# Patient Record
Sex: FEMALE | Race: WHITE | Employment: FULL TIME | URBAN - METROPOLITAN AREA
[De-identification: names, ages, dates, MRNs, and addresses within clinical notes are randomized per-mention and may not be internally consistent; named-entity substitution may affect disease eponyms.]

---

## 2020-03-17 ENCOUNTER — OFFICE VISIT (OUTPATIENT)
Dept: PRIMARY CARE CLINIC | Age: 30
End: 2020-03-17

## 2020-03-17 VITALS
SYSTOLIC BLOOD PRESSURE: 125 MMHG | OXYGEN SATURATION: 98 % | TEMPERATURE: 99.1 F | WEIGHT: 228 LBS | RESPIRATION RATE: 16 BRPM | HEART RATE: 75 BPM | BODY MASS INDEX: 37.99 KG/M2 | DIASTOLIC BLOOD PRESSURE: 81 MMHG | HEIGHT: 65 IN

## 2020-03-17 DIAGNOSIS — E28.2 PCOD (POLYCYSTIC OVARIAN DISEASE): Primary | ICD-10-CM

## 2020-03-17 DIAGNOSIS — L21.9 SEBORRHEIC DERMATITIS: ICD-10-CM

## 2020-03-17 DIAGNOSIS — N92.6 IRREGULAR PERIODS: ICD-10-CM

## 2020-03-17 DIAGNOSIS — Z12.4 CERVICAL CANCER SCREENING: ICD-10-CM

## 2020-03-17 DIAGNOSIS — Z20.2 POSSIBLE EXPOSURE TO STD: ICD-10-CM

## 2020-03-17 DIAGNOSIS — E66.9 OBESITY (BMI 30-39.9): ICD-10-CM

## 2020-03-17 RX ORDER — SPIRONOLACTONE 25 MG/1
25 TABLET ORAL DAILY
Qty: 30 TAB | Refills: 0 | Status: SHIPPED | OUTPATIENT
Start: 2020-03-17 | End: 2020-04-09

## 2020-03-17 RX ORDER — NORGESTIMATE AND ETHINYL ESTRADIOL 0.25-0.035
1 KIT ORAL DAILY
Qty: 2 DOSE PACK | Refills: 0 | Status: SHIPPED | OUTPATIENT
Start: 2020-03-17 | End: 2020-05-04

## 2020-03-17 NOTE — PROGRESS NOTES
Written by Alexander Crum, as dictated by Dr. Akua Rahman MD.    Dnona Hawkins is a 34 y.o. female. HPI  The patient presents today to establish care. Pt is not fasting for labs. She has recently moved to the [facility] from [place] and was referred here by a co-worker. She has not seen a PCP in two years, as she did not have insurance. She would like to get refills of birth control and Spirolactone for acne. She has a history of irregular periods, but does not have a known history of PCOS. She has been trying to control her weight through dietary changes, but she has had difficulty in losing weight. She also requests STD and pregnancy testing today, as she is sexually active. Of note, she has a family history of thyroid issues (mother). She denies history of Vitamin D deficiency. She has a history of seborrheic dermatitis and used a shampoo in the past that she would like to resume using. She remembers that the shampoo is brown in color, but cannot remember the name. She reports that her scalp issues tend to be worse with cold weather. She does not smoke. She would like a referral for OB/GYN today. She did not receive a flu vaccine. 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 Patient Active Problem List   Diagnosis Code    PCOD (polycystic ovarian disease) E28.2        No current outpatient medications on file prior to visit. No current facility-administered medications on file prior to visit. Allergies   Allergen Reactions    Sulfamethoxazole-Trimethoprim Hives       History reviewed. No pertinent past medical history.     Past Surgical History:   Procedure Laterality Date    HX HEENT         Family History   Problem Relation Age of Onset    Thyroid Disease Mother     Hypertension Father     No Known Problems Sister     OCD Brother        Social History     Socioeconomic History    Marital status: SINGLE     Spouse name: Not on file    Number of children: Not on file    Years of education: Not on file    Highest education level: Not on file   Occupational History    Not on file   Social Needs    Financial resource strain: Not on file    Food insecurity     Worry: Not on file     Inability: Not on file    Transportation needs     Medical: Not on file     Non-medical: Not on file   Tobacco Use    Smoking status: Former Smoker     Last attempt to quit: 3/17/2018     Years since quittin.0    Smokeless tobacco: Never Used   Substance and Sexual Activity    Alcohol use: Yes     Alcohol/week: 3.0 standard drinks     Types: 1 Glasses of wine, 1 Cans of beer, 1 Shots of liquor per week     Comment: 2 x weekly on weekends    Drug use: Not Currently    Sexual activity: Yes     Partners: Male     Birth control/protection: None   Lifestyle    Physical activity     Days per week: Not on file     Minutes per session: Not on file    Stress: Not on file   Relationships    Social connections     Talks on phone: Not on file     Gets together: Not on file     Attends Buddhism service: Not on file     Active member of club or organization: Not on file     Attends meetings of clubs or organizations: Not on file     Relationship status: Not on file    Intimate partner violence     Fear of current or ex partner: Not on file     Emotionally abused: Not on file     Physically abused: Not on file     Forced sexual activity: Not on file   Other Topics Concern    Not on file   Social History Narrative    Not on file       Review of Systems   Constitutional: Negative for malaise/fatigue and weight loss. HENT: Negative for congestion and hearing loss. Eyes: Negative for blurred vision and photophobia. Respiratory: Negative for cough and shortness of breath. Cardiovascular: Negative for chest pain and leg swelling. Gastrointestinal: Negative for constipation, diarrhea and heartburn. Genitourinary: Negative for dysuria, frequency and urgency. Musculoskeletal: Negative for joint pain and myalgias. Skin:        Positive for dry skin (scalp). Neurological: Negative for dizziness and headaches. Psychiatric/Behavioral: Negative for depression and substance abuse. The patient is not nervous/anxious and does not have insomnia. Visit Vitals  /81   Pulse 75   Temp 99.1 °F (37.3 °C) (Oral)   Resp 16   Ht 5' 5\" (1.651 m)   Wt 228 lb (103.4 kg)   LMP 02/17/2020   SpO2 98%   BMI 37.94 kg/m²       Physical Exam  Vitals signs and nursing note reviewed. Constitutional:       General: She is not in acute distress. Appearance: Normal appearance. She is well-developed and well-groomed. She is obese. She is not diaphoretic. HENT:      Head: Normocephalic and atraumatic. Nose:      Right Sinus: No maxillary sinus tenderness. Left Sinus: No maxillary sinus tenderness. Eyes:      General:         Right eye: No discharge. Left eye: No discharge. Conjunctiva/sclera: Conjunctivae normal.      Pupils: Pupils are equal, round, and reactive to light. Neck:      Musculoskeletal: Normal range of motion and neck supple. Thyroid: No thyromegaly. Cardiovascular:      Rate and Rhythm: Normal rate and regular rhythm. Pulses: Normal pulses. Heart sounds: Normal heart sounds. No murmur. No friction rub. No gallop. Pulmonary:      Effort: Pulmonary effort is normal.      Breath sounds: Normal breath sounds. No wheezing. Abdominal:      General: Bowel sounds are normal. There is no distension. Palpations: Abdomen is soft. Tenderness: There is no abdominal tenderness. Lymphadenopathy:      Cervical: No cervical adenopathy. Neurological:      Mental Status: She is alert and oriented to person, place, and time. Psychiatric:         Behavior: Behavior normal.         Thought Content: Thought content normal.       ASSESSMENT and PLAN    ICD-10-CM ICD-9-CM    1.  PCOD (polycystic ovarian disease) E28.2 256.4 REFERRAL TO OBSTETRICS AND GYNECOLOGY    Referred to OB/GYN. spironolactone (ALDACTONE) 25 mg tablet sent to pharmacy    Spirolactone 25 mg prescribed. Potential side effects were discussed. Pt should take 1 tab daily. METABOLIC PANEL, COMPREHENSIVE      CBC W/O DIFF      TSH 3RD GENERATION      INSULIN    CMP, CBC, TSH, and Insulin ordered. 2. Possible exposure to STD Z20.2 V01.6 CT+NG+M GENITALIUM BY KAROL, UR      HCG QL SERUM      BETA HCG, QT    CT+NG+M Genitalium, HCG QL serum, and beta HCG ordered. 3. Seborrheic dermatitis L21.9 690.10 Recommended that she purchase OTC T-gel shampoo 2-3 times per week. Written instructions were given to the pt.   4. Obesity (BMI 30-39. 9) E66.9 278.00 Explained that restarting Spirolactone may help with controlling food cravings. 5. Cervical cancer screening Z12.4 V76.2 REFERRAL TO OBSTETRICS AND GYNECOLOGY    Referred to OB/GYN. 6. Irregular periods N92.6 626.4 norgestimate-ethinyl estradioL (ORTHO-CYCLEN, SPRINTEC) 0.25-35 mg-mcg tab sent to pharmacy    Norgestimate-ethinyl estradiol 0.25-35 mg-mcg prescribed. Potential side effects were discussed. Pt should take 1 tab daily. HCG QL SERUM      BETA HCG, QT    HCG QL serum and beta HCG ordered. This plan was reviewed with the patient and patient agrees. All questions were answered. This scribe documentation was reviewed by me and accurately reflects the examination and decisions made by me. This note will not be viewable in [address]

## 2020-03-17 NOTE — PROGRESS NOTES
Chief Complaint   Patient presents with   [facility] Establish Care     new patient, wants STD testing, referral to GYN ,         1. Have you been to the ER, urgent care clinic since your last visit? [facility]. Hospitalized since your last visit? 2. Have you seen or consulted any other health care providers outside of the [address] [place] [address] since your last visit? Include any pap smears or colon screening.  no

## 2020-03-18 LAB
ALBUMIN SERPL-MCNC: 4.6 G/DL (ref 3.9–5)
ALBUMIN/GLOB SERPL: 1.6 {RATIO} (ref 1.2–2.2)
ALP SERPL-CCNC: 80 IU/L (ref 39–117)
ALT SERPL-CCNC: 8 IU/L (ref 0–32)
AST SERPL-CCNC: 12 IU/L (ref 0–40)
B-HCG SERPL QL: NEGATIVE MIU/ML
BILIRUB SERPL-MCNC: <0.2 MG/DL (ref 0–1.2)
BUN SERPL-MCNC: 7 MG/DL (ref 6–20)
BUN/CREAT SERPL: 11 (ref 9–23)
CALCIUM SERPL-MCNC: 9.3 MG/DL (ref 8.7–10.2)
CHLORIDE SERPL-SCNC: 105 MMOL/L (ref 96–106)
CO2 SERPL-SCNC: 21 MMOL/L (ref 20–29)
CREAT SERPL-MCNC: 0.64 MG/DL (ref 0.57–1)
ERYTHROCYTE [DISTWIDTH] IN BLOOD BY AUTOMATED COUNT: 12.9 % (ref 11.7–15.4)
GLOBULIN SER CALC-MCNC: 2.9 G/DL (ref 1.5–4.5)
GLUCOSE SERPL-MCNC: 107 MG/DL (ref 65–99)
HCG INTACT+B SERPL-ACNC: <1 MIU/ML
HCT VFR BLD AUTO: 36.4 % (ref 34–46.6)
HGB BLD-MCNC: 12.1 G/DL (ref 11.1–15.9)
INSULIN SERPL-ACNC: 52 UIU/ML (ref 2.6–24.9)
MCH RBC QN AUTO: 29.9 PG (ref 26.6–33)
MCHC RBC AUTO-ENTMCNC: 33.2 G/DL (ref 31.5–35.7)
MCV RBC AUTO: 90 FL (ref 79–97)
PLATELET # BLD AUTO: 320 X10E3/UL (ref 150–450)
POTASSIUM SERPL-SCNC: 4 MMOL/L (ref 3.5–5.2)
PROT SERPL-MCNC: 7.5 G/DL (ref 6–8.5)
RBC # BLD AUTO: 4.05 X10E6/UL (ref 3.77–5.28)
SODIUM SERPL-SCNC: 141 MMOL/L (ref 134–144)
TSH SERPL DL<=0.005 MIU/L-ACNC: 0.99 UIU/ML (ref 0.45–4.5)
WBC # BLD AUTO: 12.3 X10E3/UL (ref 3.4–10.8)

## 2020-03-23 LAB
C TRACH RRNA UR QL NAA+PROBE: NEGATIVE
MYCOPLASMA GENITALIUM NAA, 180024: NEGATIVE
N GONORRHOEA RRNA UR QL NAA+PROBE: NEGATIVE

## 2020-04-09 DIAGNOSIS — E28.2 PCOD (POLYCYSTIC OVARIAN DISEASE): ICD-10-CM

## 2020-04-09 RX ORDER — SPIRONOLACTONE 25 MG/1
TABLET ORAL
Qty: 30 TAB | Refills: 0 | Status: SHIPPED | OUTPATIENT
Start: 2020-04-09 | End: 2020-05-09

## 2020-05-09 DIAGNOSIS — E28.2 PCOD (POLYCYSTIC OVARIAN DISEASE): ICD-10-CM

## 2020-05-09 RX ORDER — SPIRONOLACTONE 25 MG/1
TABLET ORAL
Qty: 30 TAB | Refills: 0 | Status: SHIPPED | OUTPATIENT
Start: 2020-05-09 | End: 2020-06-06

## 2020-06-06 DIAGNOSIS — E28.2 PCOD (POLYCYSTIC OVARIAN DISEASE): ICD-10-CM

## 2020-06-06 RX ORDER — SPIRONOLACTONE 25 MG/1
TABLET ORAL
Qty: 30 TAB | Refills: 0 | Status: SHIPPED | OUTPATIENT
Start: 2020-06-06 | End: 2020-07-01

## 2020-06-17 DIAGNOSIS — L21.9 SEBORRHEIC DERMATITIS: Primary | ICD-10-CM

## 2020-06-17 RX ORDER — CLINDAMYCIN PHOSPHATE AND BENZOYL PEROXIDE 10; 50 MG/G; MG/G
GEL TOPICAL
Qty: 1 TUBE | Refills: 0 | Status: SHIPPED | OUTPATIENT
Start: 2020-06-17 | End: 2021-01-28 | Stop reason: ALTCHOICE

## 2020-06-17 NOTE — TELEPHONE ENCOUNTER
Last office visit 3/17/2020  Last med refill  spironolactate-taking 25 wants it increased.   6/6/2020  clyndamycin not ordered here before

## 2020-06-24 ENCOUNTER — TELEPHONE (OUTPATIENT)
Dept: PRIMARY CARE CLINIC | Age: 30
End: 2020-06-24

## 2020-06-24 ENCOUNTER — DOCUMENTATION ONLY (OUTPATIENT)
Dept: PRIMARY CARE CLINIC | Age: 30
End: 2020-06-24

## 2020-06-24 ENCOUNTER — PATIENT MESSAGE (OUTPATIENT)
Dept: PRIMARY CARE CLINIC | Age: 30
End: 2020-06-24

## 2020-06-24 DIAGNOSIS — L70.8 OTHER ACNE: Primary | ICD-10-CM

## 2020-06-24 RX ORDER — ERYTHROMYCIN AND BENZOYL PEROXIDE 30; 50 MG/G; MG/G
GEL TOPICAL 2 TIMES DAILY
Qty: 46.6 G | Refills: 0 | Status: SHIPPED | OUTPATIENT
Start: 2020-06-24 | End: 2020-08-27

## 2020-06-24 NOTE — TELEPHONE ENCOUNTER
Let her know I sent another gel. If it`s not covered then she needs to find out form her insurance formulary .

## 2020-07-01 DIAGNOSIS — E28.2 PCOD (POLYCYSTIC OVARIAN DISEASE): ICD-10-CM

## 2020-07-01 RX ORDER — SPIRONOLACTONE 25 MG/1
TABLET ORAL
Qty: 30 TAB | Refills: 0 | Status: SHIPPED | OUTPATIENT
Start: 2020-07-01 | End: 2021-01-28 | Stop reason: SDUPTHER

## 2020-08-27 DIAGNOSIS — L70.8 OTHER ACNE: ICD-10-CM

## 2020-08-27 RX ORDER — ERYTHROMYCIN AND BENZOYL PEROXIDE 30; 50 MG/G; MG/G
GEL TOPICAL
Qty: 46.6 G | Refills: 0 | Status: SHIPPED | OUTPATIENT
Start: 2020-08-27 | End: 2020-09-24

## 2020-09-24 DIAGNOSIS — L70.8 OTHER ACNE: ICD-10-CM

## 2020-09-24 RX ORDER — ERYTHROMYCIN AND BENZOYL PEROXIDE 30; 50 MG/G; MG/G
GEL TOPICAL
Qty: 46.6 G | Refills: 0 | Status: SHIPPED | OUTPATIENT
Start: 2020-09-24 | End: 2021-01-28 | Stop reason: ALTCHOICE

## 2021-01-28 ENCOUNTER — OFFICE VISIT (OUTPATIENT)
Dept: PRIMARY CARE CLINIC | Age: 31
End: 2021-01-28
Payer: COMMERCIAL

## 2021-01-28 VITALS
HEART RATE: 88 BPM | WEIGHT: 224.6 LBS | SYSTOLIC BLOOD PRESSURE: 122 MMHG | DIASTOLIC BLOOD PRESSURE: 86 MMHG | RESPIRATION RATE: 16 BRPM | OXYGEN SATURATION: 96 % | TEMPERATURE: 97.5 F | BODY MASS INDEX: 37.42 KG/M2 | HEIGHT: 65 IN

## 2021-01-28 DIAGNOSIS — Z00.00 PHYSICAL EXAM: Primary | ICD-10-CM

## 2021-01-28 DIAGNOSIS — B37.31 VAGINAL CANDIDIASIS: ICD-10-CM

## 2021-01-28 DIAGNOSIS — R30.0 BURNING WITH URINATION: ICD-10-CM

## 2021-01-28 DIAGNOSIS — L70.8 OTHER ACNE: ICD-10-CM

## 2021-01-28 DIAGNOSIS — E28.2 PCOD (POLYCYSTIC OVARIAN DISEASE): ICD-10-CM

## 2021-01-28 DIAGNOSIS — E66.9 OBESITY (BMI 30-39.9): ICD-10-CM

## 2021-01-28 DIAGNOSIS — L21.9 SEBORRHEIC DERMATITIS: ICD-10-CM

## 2021-01-28 PROCEDURE — 99395 PREV VISIT EST AGE 18-39: CPT | Performed by: INTERNAL MEDICINE

## 2021-01-28 RX ORDER — ERYTHROMYCIN AND BENZOYL PEROXIDE 30; 50 MG/G; MG/G
GEL TOPICAL 2 TIMES DAILY
Qty: 46.6 G | Refills: 0 | Status: SHIPPED | OUTPATIENT
Start: 2021-01-28 | End: 2021-10-28 | Stop reason: SDUPTHER

## 2021-01-28 RX ORDER — SPIRONOLACTONE 25 MG/1
25 TABLET ORAL DAILY
Qty: 90 TAB | Refills: 1 | Status: SHIPPED | OUTPATIENT
Start: 2021-01-28 | End: 2021-04-28

## 2021-01-28 RX ORDER — FLUCONAZOLE 150 MG/1
150 TABLET ORAL DAILY
Qty: 1 TAB | Refills: 0 | Status: SHIPPED | OUTPATIENT
Start: 2021-01-28 | End: 2021-01-29

## 2021-01-28 NOTE — PROGRESS NOTES
Written by Saranya Forbes, as dictated by Dr. Marykay Bence, MD.    Shakir Newman (: 1990) is a 31 y.o. female, established patient, here for evaluation of the following chief complaint(s):  Complete Physical (has a obgyn for pap.  states that she has problem with urinating ad would like std as well as a yeast infection. hurts when urinating and some itching )     SUBJECTIVE/OBJECTIVE:  HPI  The patient comes in today for a complete physical examination. She has lost weight from 228 lb on 20 to 224 lb today and has completely cut out soda and started going to the gym. She has been using the erythromycin gel I prescribed but it has been drying out her skin. The old clindamycin gel had been working well for her but her insurance would not cover it last time I prescribed it. She has been using T-Gel for the seborrheic dermatitis on her scalp but it has been causing irritation, and she inquires if she can try something else. In the past she has used a milky brown prescription shampoo with good relief. She follows with SHAHID Garcia and had her Pap smear done in 2020, but she has been getting vaginal irritation and burning so she would like to have STD testing done. She has had chlamydia before and this feels somewhat like that. She continues on Sprintec from Dr. Becky Garcia. She has a rash on her inner thighs she would like to have evaluated. She thinks it may come from sweating a lot in this area and then getting irritation from wearing leggings. She has been trying to apply petroleum jelly to the area with no relief and sometimes gets boils. She just got the first [address] vaccine and will bring in her vaccination card when she has the second vaccine.      Patient Active Problem List   Diagnosis Code    PCOD (polycystic ovarian disease) E28.2        Current Outpatient Medications on File Prior to Visit   Medication Sig Dispense Refill    [address] [place] [address], , 0.25-35 mg-mcg tab TAKE 1 TABLET BY MOUTH EVERY DAY 30 Dose Pack 1    [DISCONTINUED] benzoyl peroxide-erythromycin (BENZAMYCIN) 3-5 % topical gel APPLY TO AFFECTED AREA TWICE A DAY 46.6 g 0    [DISCONTINUED] spironolactone (ALDACTONE) 25 mg tablet TAKE 1 TABLET BY MOUTH EVERY DAY 30 Tab 0    [DISCONTINUED] clindamycin-benzoyl Peroxide (DUAC) 1.2 %(1 % base) -5 % SR topical gel Apply  to affected area nightly. 1 Tube 0     No current facility-administered medications on file prior to visit. Allergies   Allergen Reactions    Sulfamethoxazole-Trimethoprim Hives       History reviewed. No pertinent past medical history. Past Surgical History:   Procedure Laterality Date    HX HEENT         Family History   Problem Relation Age of Onset    Thyroid Disease Mother     Hypertension Father     No Known Problems Sister     OCD Brother        Social History     Socioeconomic History    Marital status: SINGLE     Spouse name: Not on file    Number of children: Not on file    Years of education: Not on file    Highest education level: Not on file   Occupational History    Not on file   Social Needs    Financial resource strain: Not on file    Food insecurity     Worry: Not on file     Inability: Not on file    Transportation needs     Medical: Not on file     Non-medical: Not on file   Tobacco Use    Smoking status: Former Smoker     Quit date: 3/17/2018     Years since quittin.8    Smokeless tobacco: Never Used   Substance and Sexual Activity    Alcohol use:  Yes     Alcohol/week: 3.0 standard drinks     Types: 1 Glasses of wine, 1 Cans of beer, 1 Shots of liquor per week     Comment: 2 x weekly on weekends    Drug use: Not Currently    Sexual activity: Yes     Partners: Male     Birth control/protection: None   Lifestyle    Physical activity     Days per week: Not on file     Minutes per session: Not on file    Stress: Not on file   Relationships    Social connections     Talks on phone: Not on file     Gets together: Not on file     Attends Sabianism service: Not on file     Active member of club or organization: Not on file     Attends meetings of clubs or organizations: Not on file     Relationship status: Not on file    Intimate partner violence     Fear of current or ex partner: Not on file     Emotionally abused: Not on file     Physically abused: Not on file     Forced sexual activity: Not on file   Other Topics Concern    Not on file   Social History Narrative    Not on file       No visits with results within 3 Month(s) from this visit. Latest known visit with results is:   Office Visit on 03/17/2020   Component Date Value Ref Range Status    Glucose 03/17/2020 107* 65 - 99 mg/dL Final    BUN 03/17/2020 7  6 - 20 mg/dL Final    Creatinine 03/17/2020 0.64  0.57 - 1.00 mg/dL Final    GFR est non-AA 03/17/2020 123  >59 mL/min/1.73 Final    GFR est AA 03/17/2020 141  >59 mL/min/1.73 Final    BUN/Creatinine ratio 03/17/2020 11  9 - 23 Final    Sodium 03/17/2020 141  134 - 144 mmol/L Final    Potassium 03/17/2020 4.0  3.5 - 5.2 mmol/L Final    Chloride 03/17/2020 105  96 - 106 mmol/L Final    CO2 03/17/2020 21  20 - 29 mmol/L Final    Calcium 03/17/2020 9.3  8.7 - 10.2 mg/dL Final    Protein, total 03/17/2020 7.5  6.0 - 8.5 g/dL Final    Albumin 03/17/2020 4.6  3.9 - 5.0 g/dL Final    GLOBULIN, TOTAL 03/17/2020 2.9  1.5 - 4.5 g/dL Final    A-G Ratio 03/17/2020 1.6  1.2 - 2.2 Final    Bilirubin, total 03/17/2020 <0.2  0.0 - 1.2 mg/dL Final    Alk.  phosphatase 03/17/2020 80  39 - 117 IU/L Final    AST (SGOT) 03/17/2020 12  0 - 40 IU/L Final    ALT (SGPT) 03/17/2020 8  0 - 32 IU/L Final    WBC 03/17/2020 12.3* 3.4 - 10.8 x10E3/uL Final    RBC 03/17/2020 4.05  3.77 - 5.28 x10E6/uL Final    HGB 03/17/2020 12.1  11.1 - 15.9 g/dL Final    HCT 03/17/2020 36.4  34.0 - 46.6 % Final    MCV 03/17/2020 90  79 - 97 fL Final    MCH 03/17/2020 29.9  26.6 - 33.0 pg Final    MCHC 03/17/2020 33.2  31.5 - 35.7 g/dL Final    RDW 03/17/2020 12.9  11.7 - 15.4 % Final    PLATELET 38/65/2917 543  150 - 450 x10E3/uL Final    TSH 03/17/2020 0.993  0.450 - 4.500 uIU/mL Final    Insulin 03/17/2020 52.0* 2.6 - 24.9 uIU/mL Final    M. genitalium by KAROL 03/17/2020 Negative  Negative Final    C. trachomatis by KAROL 03/17/2020 Negative  Negative Final    N. gonorrhoeae by KAROL 03/17/2020 Negative  Negative Final    hCG,Beta Subunit,Ql. 03/17/2020 Negative  Negative <6 mIU/mL Final    HCG, beta, QT 03/17/2020 <1  mIU/mL Final    Comment:                      Female (Non-pregnant)    0 -     5                              (Postmenopausal)  0 -     8                       Female (Pregnant)                       Weeks of Gestation                               3                6 -    71                               4               10 -   750                               5              349 -  6100                               6              583 - 75748                               7             [id number] -[id number]                               8            [id number] -[id number]                               9            [id number] -[id number]                              10            37005 -[id number]                              12            [id number] -[id number]                              14            85472 - 57631                              15            98636 - 78949                              16             6967 - 99874                              17             2907 - 87938                              18             1778 - 45051  Roche E                           CLIA methodology       Review of Systems   Constitutional: Negative for activity change, fatigue and unexpected weight change. HENT: Negative for congestion, hearing loss, rhinorrhea and sore throat. Eyes: Negative for discharge. Respiratory: Negative for cough, chest tightness and shortness of breath. Cardiovascular: Negative for leg swelling.    Gastrointestinal: Negative for abdominal pain, constipation and diarrhea. Genitourinary: Positive for dysuria, vaginal discharge and vaginal pain (irritation). Negative for flank pain, frequency and urgency. Musculoskeletal: Negative for arthralgias, back pain and myalgias. Skin: Positive for rash (inner thighs). Negative for color change. +dry skin on scalp  +facial acne   Neurological: Negative for dizziness, light-headedness and headaches. Psychiatric/Behavioral: Negative for dysphoric mood and sleep disturbance. The patient is not nervous/anxious. Visit Vitals  /86 (BP 1 Location: Left arm, BP Patient Position: Sitting)   Pulse 88   Temp 97.5 °F (36.4 °C) (Temporal)   Resp 16   Ht 5' 5\" (1.651 m)   Wt 224 lb 9.6 oz (101.9 kg)   LMP 01/21/2021   SpO2 96%   BMI 37.38 kg/m²     Physical Exam  Vitals signs and nursing note reviewed. Constitutional:       General: She is not in acute distress. Appearance: Normal appearance. She is obese. She is not diaphoretic. HENT:      Right Ear: Tympanic membrane, ear canal and external ear normal.      Left Ear: Tympanic membrane, ear canal and external ear normal.      Mouth/Throat:      Mouth: Mucous membranes are moist.      Pharynx: Oropharynx is clear. Eyes:      General:         Right eye: No discharge. Left eye: No discharge. Extraocular Movements: Extraocular movements intact. Conjunctiva/sclera: Conjunctivae normal.   Neck:      Thyroid: No thyromegaly. Cardiovascular:      Rate and Rhythm: Normal rate and regular rhythm. Pulses: Normal pulses. Dorsalis pedis pulses are 2+ on the right side and 2+ on the left side. Pulmonary:      Effort: Pulmonary effort is normal.      Breath sounds: Normal breath sounds. No wheezing. Abdominal:      General: Bowel sounds are normal. There is no distension. Palpations: Abdomen is soft. Tenderness: There is no abdominal tenderness.    Musculoskeletal:      Right lower leg: No edema. Left lower leg: No edema. Comments: B/L knees without crepitus   Lymphadenopathy:      Cervical: No cervical adenopathy. Skin:     Findings: Rash (inner thighs) present. Neurological:      Deep Tendon Reflexes:      Reflex Scores:       Patellar reflexes are 2+ on the right side and 2+ on the left side. Psychiatric:         Mood and Affect: Mood and affect normal.       ASSESSMENT/PLAN:  1. Physical exam  -     METABOLIC PANEL, COMPREHENSIVE; Future  -     CBC W/O DIFF; Future  -     LIPID PANEL; Future  -     TSH 3RD GENERATION; Future  Complete physical exam done. Basic labs drawn. 2. Burning with urination  -     AMB POC URINALYSIS DIP STICK AUTO W/O MICRO  -     CT+NG+M GENITALIUM BY KAROL, UR; Future  Urine sample obtained and labs drawn in office today. 3. PCOD (polycystic ovarian disease)  -     spironolactone (ALDACTONE) 25 mg tablet; Take 1 Tab by mouth daily for 90 days. , Normal, Disp-90 Tab, R-1 sent to pharmacy. Stable, continues on spironolactone. 4. Obesity (BMI 30-39. 9)  I commended the pt on their healthy lifestyle choices and routines. Explained that they should be walking 5,000 steps daily to maintain conditioning and weight and increase to at least 10,000 steps to work on losing weight. She has done well cutting out soda. 5. Other acne  -     benzoyl peroxide-erythromycin (BENZAMYCIN) 3-5 % topical gel; Apply  to affected area two (2) times a day., Normal, Disp-46.6 g, R-0 sent to pharmacy. 6. Seborrheic dermatitis  -     ketoconazole 1 % sham; Apply to the scalp 3 times a week., Normal, Disp-125 mL, R-0 sent to pharmacy. Potential side effects were discussed. 7. Vaginal candidiasis  -     fluconazole (DIFLUCAN) 150 mg tablet; Take 1 Tab by mouth daily for 1 day. FDA advises cautious prescribing of oral fluconazole in pregnancy. , Normal, Disp-1 Tab, R-0 sent to pharmacy. Potential side effects were discussed.    I prescribed Diflucan and instructed her to start applying OTC Monistat as well. Additional Comments: I explained that since the rash comes from moisture in the area, petroleum jelly adds more moisture and will only make matters worse. Instead, she should apply baby powder or baby cream with zinc that will help with healing. This plan was reviewed with the patient and patient agrees. All questions were answered. This scribe documentation was reviewed by me and accurately reflects the examination and decisions made by me. An electronic signature was used to authenticate this note.   -- Flaquita Gates

## 2021-01-28 NOTE — PROGRESS NOTES
Chief Complaint   Patient presents with    Complete Physical     has a obgyn for pap.  states that she has problem with urinating ad would like std as well as a yeast infection.  hurts when urinating and some itching

## 2021-01-29 ENCOUNTER — TELEPHONE (OUTPATIENT)
Dept: PRIMARY CARE CLINIC | Age: 31
End: 2021-01-29

## 2021-01-29 NOTE — TELEPHONE ENCOUNTER
Pt was seen yesterday and there was a script sent in for spironolactone 25mg and needed to be 100mg. She said she saw a gynecologist last year and they increased dosage and they are faxing over those notes.  Iin the mean time she has the 25mg tab and will  Need to take 4 , so she will need refill sooner

## 2021-02-01 NOTE — TELEPHONE ENCOUNTER
Call placed to patient to schedule an appointment to discuss lab results. No answer. Unable to leave voice message due to mailbox is full. Also sent [facility] message.

## 2021-02-03 ENCOUNTER — OFFICE VISIT (OUTPATIENT)
Dept: PRIMARY CARE CLINIC | Age: 31
End: 2021-02-03
Payer: COMMERCIAL

## 2021-02-03 VITALS
OXYGEN SATURATION: 97 % | RESPIRATION RATE: 16 BRPM | BODY MASS INDEX: 36.82 KG/M2 | HEIGHT: 65 IN | HEART RATE: 88 BPM | WEIGHT: 221 LBS | SYSTOLIC BLOOD PRESSURE: 131 MMHG | DIASTOLIC BLOOD PRESSURE: 86 MMHG | TEMPERATURE: 97.8 F

## 2021-02-03 DIAGNOSIS — B37.31 VAGINAL CANDIDIASIS: ICD-10-CM

## 2021-02-03 DIAGNOSIS — D72.828 OTHER ELEVATED WHITE BLOOD CELL (WBC) COUNT: Primary | ICD-10-CM

## 2021-02-03 DIAGNOSIS — E87.1 HYPONATREMIA: ICD-10-CM

## 2021-02-03 DIAGNOSIS — E28.2 PCOD (POLYCYSTIC OVARIAN DISEASE): ICD-10-CM

## 2021-02-03 DIAGNOSIS — E78.2 MIXED HYPERLIPIDEMIA: ICD-10-CM

## 2021-02-03 PROCEDURE — 99214 OFFICE O/P EST MOD 30 MIN: CPT | Performed by: INTERNAL MEDICINE

## 2021-02-03 RX ORDER — FLUCONAZOLE 150 MG/1
150 TABLET ORAL DAILY
Qty: 3 TAB | Refills: 0 | Status: SHIPPED | OUTPATIENT
Start: 2021-02-03 | End: 2021-02-06

## 2021-02-03 NOTE — PROGRESS NOTES
Written by Johanna Baxter, as dictated by Dr. Randolph Vo MD.    Rogelio Olmstead (: 1990) is a 31 y.o. female, established patient, here for evaluation of the following chief complaint(s):  Follow-up (patient is here to go over lab results.)     SUBJECTIVE/OBJECTIVE:  HPI  Pt presents today to discuss the results of her labs drawn on 21. Her WBC came back elevated at 17.7, and she does note she had just gotten the Moderna vaccine the day before her labs and was also concerned she had a yeast infection at the time. She had a lot of body aches from the vaccine that day and also had vaginal discomfort from the yeast infection that day, but today she is starting to feel much better. Her next Moderna vaccine is 21. Her sodium came back low at 133 on her labs and she notes that she had recently stopped drinking soda but also has not been drinking very much water. She remembered feeling tired, dizzy and a bit \"spacey\" around the time of her last appt. Today she is much better. Her lipid panel showed elevated total cholesterol (234), triglyceride (254), and LDL (130.2). She was wondering if she will be started on metformin. She has lost weight from 228 lb on 20 to 221 lb today. She inquires if she should increase her dose of spironolactone since she has taken 100 mg every day before. Patient Active Problem List   Diagnosis Code    PCOD (polycystic ovarian disease) E28.2        Current Outpatient Medications on File Prior to Visit   Medication Sig Dispense Refill    benzoyl peroxide-erythromycin (BENZAMYCIN) 3-5 % topical gel Apply  to affected area two (2) times a day. 46.6 g 0    spironolactone (ALDACTONE) 25 mg tablet Take 1 Tab by mouth daily for 90 days. 90 Tab 1    ketoconazole 1 % sham Apply to the scalp 3 times a week.  125 mL 0    Sprintec, 28, 0.25-35 mg-mcg tab TAKE 1 TABLET BY MOUTH EVERY DAY 30 Dose Pack 1     No current facility-administered medications on file prior to visit. Allergies   Allergen Reactions    Sulfamethoxazole-Trimethoprim Hives       No past medical history on file. Past Surgical History:   Procedure Laterality Date    HX HEENT         Family History   Problem Relation Age of Onset    Thyroid Disease Mother     Hypertension Father     No Known Problems Sister     OCD Brother        Social History     Socioeconomic History    Marital status: SINGLE     Spouse name: Not on file    Number of children: Not on file    Years of education: Not on file    Highest education level: Not on file   Occupational History    Not on file   Social Needs    Financial resource strain: Not on file    Food insecurity     Worry: Not on file     Inability: Not on file    Transportation needs     Medical: Not on file     Non-medical: Not on file   Tobacco Use    Smoking status: Former Smoker     Quit date: 3/17/2018     Years since quittin.8    Smokeless tobacco: Never Used   Substance and Sexual Activity    Alcohol use:  Yes     Alcohol/week: 3.0 standard drinks     Types: 1 Glasses of wine, 1 Cans of beer, 1 Shots of liquor per week     Comment: 2 x weekly on weekends    Drug use: Not Currently    Sexual activity: Yes     Partners: Male     Birth control/protection: None   Lifestyle    Physical activity     Days per week: Not on file     Minutes per session: Not on file    Stress: Not on file   Relationships    Social connections     Talks on phone: Not on file     Gets together: Not on file     Attends Taoist service: Not on file     Active member of club or organization: Not on file     Attends meetings of clubs or organizations: Not on file     Relationship status: Not on file    Intimate partner violence     Fear of current or ex partner: Not on file     Emotionally abused: Not on file     Physically abused: Not on file     Forced sexual activity: Not on file   Other Topics Concern    Not on file   Social History Narrative    Not on file       Orders Only on 01/28/2021   Component Date Value Ref Range Status    Source 01/28/2021 URINE   Final    C. trachomatis by KAROL 01/28/2021 Negative  Negative   Final    N. gonorrhoeae by KAROL 01/28/2021 Negative  Negative   Final    Comment: (NOTE)  Performed At: [address] [id number]  Sheryle Sicks MD [id number]      TSH 01/28/2021 1.08  0.36 - 3.74 uIU/mL Final    Comment:   Due to TSH heterogeneity, both structurally and degree of glycosylation,  monoclonal antibodies used in the TSH assay may not accurately quantitate TSH. Therefore, this result should be correlated with clinical findings as well as  with other assessments of thyroid function, e.g., free T4, free T3.      LIPID PROFILE 01/28/2021        Final    Cholesterol, total 01/28/2021 234* <200 MG/DL Final    Triglyceride 01/28/2021 254* <150 MG/DL Final    Comment: Based on NCEP-ATP III:  Triglycerides <150 mg/dL  is considered normal, 150-199  mg/dL  borderline high,  200-499 mg/dL high and  greater than or equal to 500  mg/dL very high.  HDL Cholesterol 01/28/2021 53  MG/DL Final    Comment: Based on NCEP ATP III, HDL Cholesterol <40 mg/dL is considered low and >60  mg/dL is elevated.       LDL, calculated 01/28/2021 130.2* 0 - 100 MG/DL Final    Comment: Based on the NCEP-ATP: LDL-C concentrations are considered  optimal <100 mg/dL,  near optimal/above Normal 100-129 mg/dL Borderline High: 130-159, High: 160-189  mg/dL Very High: Greater than or equal to 190 mg/dL      VLDL, calculated 01/28/2021 50.8  MG/DL Final    CHOL/HDL Ratio 01/28/2021 4.4  0.0 - 5.0   Final    WBC 01/28/2021 17.7* 3.6 - 11.0 K/uL Final    RBC 01/28/2021 4.58  3.80 - 5.20 M/uL Final    HGB 01/28/2021 13.1  11.5 - 16.0 g/dL Final    HCT 01/28/2021 40.4  35.0 - 47.0 % Final    MCV 01/28/2021 88.2  80.0 - 99.0 FL Final    MCH 01/28/2021 28.6  26.0 - 34.0 PG Final    MCHC 01/28/2021 32.4  30.0 - 36.5 g/dL Final    RDW 01/28/2021 12.5  11.5 - 14.5 % Final    PLATELET 09/47/5947 896* 150 - 400 K/uL Final    MPV 01/28/2021 11.0  8.9 - 12.9 FL Final    NRBC 01/28/2021 0.0  0  WBC Final    ABSOLUTE NRBC 01/28/2021 0.00  0.00 - 0.01 K/uL Final    Sodium 01/28/2021 133* 136 - 145 mmol/L Final    Potassium 01/28/2021 4.4  3.5 - 5.1 mmol/L Final    Chloride 01/28/2021 102  97 - 108 mmol/L Final    CO2 01/28/2021 25  21 - 32 mmol/L Final    Anion gap 01/28/2021 6  5 - 15 mmol/L Final    Glucose 01/28/2021 84  65 - 100 mg/dL Final    BUN 01/28/2021 10  6 - 20 MG/DL Final    Creatinine 01/28/2021 0.70  0.55 - 1.02 MG/DL Final    BUN/Creatinine ratio 01/28/2021 14  12 - 20   Final    GFR est AA 01/28/2021 >60  >60 ml/min/1.73m2 Final    GFR est non-AA 01/28/2021 >60  >60 ml/min/1.73m2 Final    Comment: Estimated GFR is calculated using the IDMS-traceable Modification of Diet in  Renal Disease (MDRD) Study equation, reported for both  Americans  (GFRAA) and non- Americans (GFRNA), and normalized to 1.73m2 body  surface area. The physician must decide which value applies to the patient.  Calcium 01/28/2021 10.0  8.5 - 10.1 MG/DL Final    Bilirubin, total 01/28/2021 0.3  0.2 - 1.0 MG/DL Final    ALT (SGPT) 01/28/2021 15  12 - 78 U/L Final    AST (SGOT) 01/28/2021 9* 15 - 37 U/L Final    Alk. phosphatase 01/28/2021 93  45 - 117 U/L Final    Protein, total 01/28/2021 8.3* 6.4 - 8.2 g/dL Final    Albumin 01/28/2021 4.0  3.5 - 5.0 g/dL Final    Globulin 01/28/2021 4.3* 2.0 - 4.0 g/dL Final    A-G Ratio 01/28/2021 0.9* 1.1 - 2.2   Final     Review of Systems   Constitutional: Positive for fatigue. Negative for activity change and unexpected weight change. HENT: Negative for congestion, hearing loss, rhinorrhea and sore throat. Eyes: Negative for discharge. Respiratory: Negative for cough, chest tightness and shortness of breath.     Cardiovascular: Negative for leg swelling. Gastrointestinal: Negative for abdominal pain, constipation and diarrhea. Genitourinary: Positive for vaginal pain. Negative for dysuria, flank pain, frequency and urgency. Musculoskeletal: Negative for arthralgias, back pain and myalgias. Skin: Negative for color change and rash. Neurological: Negative for dizziness, light-headedness and headaches. Psychiatric/Behavioral: Negative for dysphoric mood and sleep disturbance. The patient is not nervous/anxious. Visit Vitals  /86 (BP 1 Location: Left upper arm, BP Patient Position: Sitting, BP Cuff Size: Large adult)   Pulse 88   Temp 97.8 °F (36.6 °C) (Temporal)   Resp 16   Ht 5' 5\" (1.651 m)   Wt 221 lb (100.2 kg)   LMP 01/21/2021   SpO2 97%   BMI 36.78 kg/m²     Physical Exam  Vitals signs and nursing note reviewed. Constitutional:       General: She is not in acute distress. Appearance: Normal appearance. She is well-developed. She is not diaphoretic. HENT:      Right Ear: External ear normal.      Left Ear: External ear normal.   Eyes:      General: No scleral icterus. Right eye: No discharge. Left eye: No discharge. Extraocular Movements: Extraocular movements intact. Conjunctiva/sclera: Conjunctivae normal.   Neck:      Musculoskeletal: Normal range of motion and neck supple. Cardiovascular:      Rate and Rhythm: Normal rate and regular rhythm. Pulmonary:      Effort: Pulmonary effort is normal.      Breath sounds: Normal breath sounds. No wheezing. Abdominal:      General: Bowel sounds are normal.      Palpations: Abdomen is soft. Tenderness: There is no abdominal tenderness. Lymphadenopathy:      Cervical: No cervical adenopathy. Neurological:      Mental Status: She is alert and oriented to person, place, and time. Psychiatric:         Mood and Affect: Mood and affect normal.         ASSESSMENT/PLAN:  1.  Other elevated white blood cell (WBC) count  -     CBC WITH AUTOMATED DIFF; Future  We discussed that her yeast infection or the Moderna vaccine were likely causing her elevated WBC. I sent more diflucan to the pharmacy for her since she is not completely back to normal, and I will re-check her labs 2 weeks after her second Moderna vaccine. 2. Mixed hyperlipidemia  -     LIPID PANEL; Future  I would like to get all of her labs done at one time 2 weeks after she gets her second Moderna vaccine. She should come in fasting so we can check her cholesterol as well. 3. Hyponatremia  -     METABOLIC PANEL, COMPREHENSIVE; Future  She is starting to feel better but has not drank very much water today so her sodium will likely not return to normal. When she comes in 2 weeks after her second Moderna vaccine for labs, she should make sure to be well-hydrated so we can see if this was causing her abnormal sodium. 4. PCOD (polycystic ovarian disease)  I do not want her to take 100 mg of spironolactone because she is so young and this can be hard on the kidneys. Instead, I decreased her spironolactone to 50 mg every day and after a little while she should go down to 25 mg every day. 5. Vaginal candidiasis  -     fluconazole (DIFLUCAN) 150 mg tablet; Take 1 Tab by mouth daily for 3 doses. FDA advises cautious prescribing of oral fluconazole in pregnancy. , Normal, Disp-3 Tab, R-0 sent to pharmacy. She is starting to feel better but not completely back to normal so I sent more diflucan to the pharmacy for her. This plan was reviewed with the patient and patient agrees. All questions were answered. This scribe documentation was reviewed by me and accurately reflects the examination and decisions made by me. An electronic signature was used to authenticate this note.   -- Rachana Palencia

## 2021-07-19 RX ORDER — SPIRONOLACTONE 25 MG/1
TABLET ORAL
Qty: 90 TABLET | Refills: 0 | Status: SHIPPED | OUTPATIENT
Start: 2021-07-19 | End: 2021-09-29 | Stop reason: SDUPTHER

## 2021-09-29 DIAGNOSIS — E28.2 PCOD (POLYCYSTIC OVARIAN DISEASE): Primary | ICD-10-CM

## 2021-09-29 DIAGNOSIS — N92.6 IRREGULAR PERIODS: ICD-10-CM

## 2021-09-29 NOTE — TELEPHONE ENCOUNTER
Patient has an appointment with Dr Meghan Carbajal on the 20th of October and is completely out of her birth control and almost out of her spironolactone. Needs refills on both.

## 2021-09-30 RX ORDER — SPIRONOLACTONE 25 MG/1
25 TABLET ORAL DAILY
Qty: 90 TABLET | Refills: 0 | Status: SHIPPED | OUTPATIENT
Start: 2021-09-30 | End: 2021-10-20 | Stop reason: ALTCHOICE

## 2021-09-30 RX ORDER — NORGESTIMATE AND ETHINYL ESTRADIOL 0.25-0.035
1 KIT ORAL DAILY
Qty: 30 DOSE PACK | Refills: 1 | Status: SHIPPED | OUTPATIENT
Start: 2021-09-30 | End: 2021-10-24

## 2021-09-30 NOTE — TELEPHONE ENCOUNTER
Requested Prescriptions     Pending Prescriptions Disp Refills    norgestimate-ethinyl estradioL (Sprintec, 28,) 0.25-35 mg-mcg tab 30 Dose Pack 1     Sig: Take 1 Tablet by mouth daily.  spironolactone (ALDACTONE) 25 mg tablet 90 Tablet 0     Sig: Take 1 Tablet by mouth daily.         Last Visit 2/3/21  Last Refill    Spironolactone 7/19/21  Sprintec  5/4/2020

## 2021-10-20 ENCOUNTER — OFFICE VISIT (OUTPATIENT)
Dept: PRIMARY CARE CLINIC | Age: 31
End: 2021-10-20
Payer: COMMERCIAL

## 2021-10-20 VITALS
SYSTOLIC BLOOD PRESSURE: 128 MMHG | RESPIRATION RATE: 15 BRPM | OXYGEN SATURATION: 97 % | HEART RATE: 80 BPM | BODY MASS INDEX: 36.25 KG/M2 | DIASTOLIC BLOOD PRESSURE: 83 MMHG | HEIGHT: 65 IN | WEIGHT: 217.6 LBS | TEMPERATURE: 97.8 F

## 2021-10-20 DIAGNOSIS — E66.9 OBESITY (BMI 30-39.9): ICD-10-CM

## 2021-10-20 DIAGNOSIS — Z11.59 NEED FOR HEPATITIS C SCREENING TEST: ICD-10-CM

## 2021-10-20 DIAGNOSIS — D72.828 OTHER ELEVATED WHITE BLOOD CELL (WBC) COUNT: ICD-10-CM

## 2021-10-20 DIAGNOSIS — E87.1 HYPONATREMIA: ICD-10-CM

## 2021-10-20 DIAGNOSIS — E78.2 MIXED HYPERLIPIDEMIA: Primary | ICD-10-CM

## 2021-10-20 DIAGNOSIS — E28.2 PCOD (POLYCYSTIC OVARIAN DISEASE): ICD-10-CM

## 2021-10-20 PROCEDURE — 99214 OFFICE O/P EST MOD 30 MIN: CPT | Performed by: INTERNAL MEDICINE

## 2021-10-20 RX ORDER — SPIRONOLACTONE 50 MG/1
50 TABLET, FILM COATED ORAL DAILY
Qty: 90 TABLET | Refills: 0 | Status: SHIPPED | OUTPATIENT
Start: 2021-10-20 | End: 2021-10-28 | Stop reason: SDUPTHER
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
## 2021-10-20 NOTE — PROGRESS NOTES
Jannet Oquendo (: 1990) is a 36 y.o. female, established patient, here for evaluation of the following chief complaint(s):  Medication Refill (recently dx with PCOS)     Written by China Bradford, as dictated by Dr. Lacey Turner MD.      ASSESSMENT/PLAN:  Below is the assessment and plan developed based on review of pertinent history, physical exam, labs, studies, and medications. 1. Mixed hyperlipidemia  Ordered labs to check lipid panel. Waiting for results. -     LIPID PANEL; Future    2. Hyponatremia   Ordered labs to check metabolic panel. Waiting for results. -     METABOLIC PANEL, COMPREHENSIVE; Future    3. Other elevated white blood cell (WBC) count  Ordered labs to check CBC levels. Waiting for results. -     CBC W/O DIFF; Future    4. Need for hepatitis C screening test  Ordered Hepatitis C test. Waiting for results.  -     HEPATITIS C AB; Future    5. PCOD (polycystic ovarian disease)  Increased spironolactone dose to 50 mg, take 1 tab daily as prescribed. -     spironolactone (ALDACTONE) 50 mg tablet; Take 1 Tablet by mouth daily for 90 days. , Normal, Disp-90 Tablet, R-0 sent to pharmacy. 6. Obesity (BMI 30-39. 9)  I commended the pt on her healthy lifestyle choices and routines. Explained that 5,000 steps are needed daily for healthy lifestyle and to maintain conditioning, and she will need to increase to 10,000 a day to work on weight loss. SUBJECTIVE/OBJECTIVE:  HPI     Patient presents today for a follow up visit. She takes spironolactone 25 mg for PCOS. She notes the medication works well but she want to increase to 50 mg as acne has been flaring up. She takes Sprintec 0.25-35 mg for irregular periods. She has been trying to lose weight. Her weight has changed from 228 lbs on 3/17/2020 to 217 lbs today. Patient Active Problem List   Diagnosis Code    PCOD (polycystic ovarian disease) E28.2    Obesity (BMI 30-39. 9) E66.9        Current Outpatient Medications on File Prior to Visit   Medication Sig Dispense Refill    norgestimate-ethinyl estradioL (Sprintec, 28,) 0.25-35 mg-mcg tab Take 1 Tablet by mouth daily. 30 Dose Pack 1    benzoyl peroxide-erythromycin (BENZAMYCIN) 3-5 % topical gel Apply  to affected area two (2) times a day. 46.6 g 0    [DISCONTINUED] spironolactone (ALDACTONE) 25 mg tablet Take 1 Tablet by mouth daily. 90 Tablet 0    ketoconazole 1 % sham Apply to the scalp 3 times a week. (Patient not taking: Reported on 10/20/2021) 125 mL 0     No current facility-administered medications on file prior to visit. Allergies   Allergen Reactions    Sulfamethoxazole-Trimethoprim Hives       History reviewed. No pertinent past medical history. Past Surgical History:   Procedure Laterality Date    HX HEENT         Family History   Problem Relation Age of Onset    Thyroid Disease Mother     Hypertension Father     No Known Problems Sister     OCD Brother        Social History     Socioeconomic History    Marital status: SINGLE     Spouse name: Not on file    Number of children: Not on file    Years of education: Not on file    Highest education level: Not on file   Occupational History    Not on file   Tobacco Use    Smoking status: Former Smoker     Quit date: 3/17/2018     Years since quitting: 3.5    Smokeless tobacco: Never Used   Vaping Use    Vaping Use: Some days    Substances: Nicotine, Flavoring    Devices: Pre-filled pod   Substance and Sexual Activity    Alcohol use:  Yes     Alcohol/week: 3.0 standard drinks     Types: 1 Glasses of wine, 1 Cans of beer, 1 Shots of liquor per week     Comment: social drink    Drug use: Not Currently    Sexual activity: Yes     Partners: Male     Birth control/protection: None   Other Topics Concern    Not on file   Social History Narrative    Not on file     Social Determinants of Health     Financial Resource Strain:     Difficulty of Paying Living Expenses:    Food Insecurity:     Worried About Running Out of Food in the Last Year:     [address] in the Last Year:    Transportation Needs:     Lack of Transportation (Medical):  Lack of Transportation (Non-Medical):    Physical Activity:     Days of Exercise per Week:     Minutes of Exercise per Session:    Stress:     Feeling of Stress :    Social Connections:     Frequency of Communication with Friends and Family:     Frequency of Social Gatherings with Friends and Family:     Attends Oriental orthodox Services:     Active Member of Clubs or Organizations:     Attends Club or Organization Meetings:     Marital Status:    Intimate Partner Violence:     Fear of Current or Ex-Partner:     Emotionally Abused:     Physically Abused:     Sexually Abused:        No visits with results within 3 Month(s) from this visit. Latest known visit with results is:   Orders Only on 01/28/2021   Component Date Value Ref Range Status    Source 01/28/2021 URINE   Final    M. genitalium by KAROL 01/28/2021 Negative  Negative   Final    Comment: (NOTE)  This test was developed and its performance characteristics  determined by [facility]. It has not been cleared or approved  by the Food and Drug Administration. Performed At: [address] [id number]  Karime Fletcher MD [place]:[id number]      C. trachomatis by KAROL 01/28/2021 Negative  Negative   Final    N. gonorrhoeae by KAROL 01/28/2021 Negative  Negative   Final    Comment: (NOTE)  Performed At: [address] [id number]  Karime Fletcher MD [id number]      TSH 01/28/2021 1.08  0.36 - 3.74 uIU/mL Final    Comment:   Due to TSH heterogeneity, both structurally and degree of glycosylation,  monoclonal antibodies used in the TSH assay may not accurately quantitate TSH.   Therefore, this result should be correlated with clinical findings as well as  with other assessments of thyroid function, e.g., free T4, free T3.      LIPID PROFILE 01/28/2021        Final    Cholesterol, total 01/28/2021 234* <200 MG/DL Final    Triglyceride 01/28/2021 254* <150 MG/DL Final    Comment: Based on NCEP-ATP III:  Triglycerides <150 mg/dL  is considered normal, 150-199  mg/dL  borderline high,  200-499 mg/dL high and  greater than or equal to 500  mg/dL very high.  HDL Cholesterol 01/28/2021 53  MG/DL Final    Comment: Based on NCEP ATP III, HDL Cholesterol <40 mg/dL is considered low and >60  mg/dL is elevated.       LDL, calculated 01/28/2021 130.2* 0 - 100 MG/DL Final    Comment: Based on the NCEP-ATP: LDL-C concentrations are considered  optimal <100 mg/dL,  near optimal/above Normal 100-129 mg/dL Borderline High: 130-159, High: 160-189  mg/dL Very High: Greater than or equal to 190 mg/dL      VLDL, calculated 01/28/2021 50.8  MG/DL Final    CHOL/HDL Ratio 01/28/2021 4.4  0.0 - 5.0   Final    WBC 01/28/2021 17.7* 3.6 - 11.0 K/uL Final    RBC 01/28/2021 4.58  3.80 - 5.20 M/uL Final    HGB 01/28/2021 13.1  11.5 - 16.0 g/dL Final    HCT 01/28/2021 40.4  35.0 - 47.0 % Final    MCV 01/28/2021 88.2  80.0 - 99.0 FL Final    MCH 01/28/2021 28.6  26.0 - 34.0 PG Final    MCHC 01/28/2021 32.4  30.0 - 36.5 g/dL Final    RDW 01/28/2021 12.5  11.5 - 14.5 % Final    PLATELET 20/91/9628 081* 150 - 400 K/uL Final    MPV 01/28/2021 11.0  8.9 - 12.9 FL Final    NRBC 01/28/2021 0.0  0  WBC Final    ABSOLUTE NRBC 01/28/2021 0.00  0.00 - 0.01 K/uL Final    Sodium 01/28/2021 133* 136 - 145 mmol/L Final    Potassium 01/28/2021 4.4  3.5 - 5.1 mmol/L Final    Chloride 01/28/2021 102  97 - 108 mmol/L Final    CO2 01/28/2021 25  21 - 32 mmol/L Final    Anion gap 01/28/2021 6  5 - 15 mmol/L Final    Glucose 01/28/2021 84  65 - 100 mg/dL Final    BUN 01/28/2021 10  6 - 20 MG/DL Final    Creatinine 01/28/2021 0.70  0.55 - 1.02 MG/DL Final    BUN/Creatinine ratio 01/28/2021 14  12 - 20   Final    GFR est AA 01/28/2021 >60  >60 ml/min/1.73m2 Final    GFR est non-AA 01/28/2021 >60  >60 ml/min/1.73m2 Final    Comment: Estimated GFR is calculated using the IDMS-traceable Modification of Diet in  Renal Disease (MDRD) Study equation, reported for both  Americans  (GFRAA) and non- Americans (GFRNA), and normalized to 1.73m2 body  surface area. The physician must decide which value applies to the patient.  Calcium 01/28/2021 10.0  8.5 - 10.1 MG/DL Final    Bilirubin, total 01/28/2021 0.3  0.2 - 1.0 MG/DL Final    ALT (SGPT) 01/28/2021 15  12 - 78 U/L Final    AST (SGOT) 01/28/2021 9* 15 - 37 U/L Final    Alk. phosphatase 01/28/2021 93  45 - 117 U/L Final    Protein, total 01/28/2021 8.3* 6.4 - 8.2 g/dL Final    Albumin 01/28/2021 4.0  3.5 - 5.0 g/dL Final    Globulin 01/28/2021 4.3* 2.0 - 4.0 g/dL Final    A-G Ratio 01/28/2021 0.9* 1.1 - 2.2   Final       Review of Systems   Constitutional: Negative for activity change, fatigue and unexpected weight change. HENT: Negative for congestion, hearing loss, rhinorrhea and sore throat. Eyes: Negative for discharge. Respiratory: Negative for cough, chest tightness and shortness of breath. Cardiovascular: Negative for leg swelling. Gastrointestinal: Negative for abdominal pain, constipation and diarrhea. Genitourinary: Negative for dysuria, flank pain, frequency and urgency. Musculoskeletal: Negative for arthralgias, back pain and myalgias. Skin: Negative for color change and rash. Neurological: Negative for dizziness, light-headedness and headaches. Psychiatric/Behavioral: Negative for dysphoric mood and sleep disturbance. The patient is not nervous/anxious. Visit Vitals  /83 (BP 1 Location: Right arm)   Pulse 80   Temp 97.8 °F (36.6 °C)   Resp 15   Ht 5' 5\" (1.651 m)   Wt 217 lb 9.6 oz (98.7 kg)   LMP 09/28/2021 (Approximate)   SpO2 97%   BMI 36.21 kg/m²       Physical Exam  Vitals and nursing note reviewed.    Constitutional:       General: She is not in acute distress. Appearance: Normal appearance. She is well-developed. She is not diaphoretic. HENT:      Right Ear: External ear normal.      Left Ear: External ear normal.   Eyes:      General: No scleral icterus. Right eye: No discharge. Left eye: No discharge. Extraocular Movements: Extraocular movements intact. Conjunctiva/sclera: Conjunctivae normal.   Cardiovascular:      Rate and Rhythm: Normal rate and regular rhythm. Pulmonary:      Effort: Pulmonary effort is normal.      Breath sounds: Normal breath sounds. No wheezing. Abdominal:      General: Bowel sounds are normal.      Palpations: Abdomen is soft. Tenderness: There is no abdominal tenderness. Musculoskeletal:      Cervical back: Normal range of motion and neck supple. Lymphadenopathy:      Cervical: No cervical adenopathy. Neurological:      Mental Status: She is alert and oriented to person, place, and time. Psychiatric:         Mood and Affect: Mood and affect normal.             An electronic signature was used to authenticate this note.   -- Selwyn Horta

## 2021-10-28 DIAGNOSIS — E28.2 PCOD (POLYCYSTIC OVARIAN DISEASE): ICD-10-CM

## 2021-10-28 DIAGNOSIS — L70.8 OTHER ACNE: ICD-10-CM

## 2021-10-28 DIAGNOSIS — N92.6 IRREGULAR PERIODS: ICD-10-CM

## 2021-10-28 RX ORDER — NORGESTIMATE AND ETHINYL ESTRADIOL 0.25-0.035
1 KIT ORAL DAILY
Qty: 2 DOSE PACK | Refills: 2 | Status: SHIPPED | OUTPATIENT
Start: 2021-10-28 | End: 2022-01-26

## 2021-10-28 RX ORDER — ERYTHROMYCIN AND BENZOYL PEROXIDE 30; 50 MG/G; MG/G
GEL TOPICAL 2 TIMES DAILY
Qty: 46.6 G | Refills: 0 | Status: SHIPPED | OUTPATIENT
Start: 2021-10-28 | End: 2022-01-28

## 2021-10-28 RX ORDER — SPIRONOLACTONE 50 MG/1
50 TABLET, FILM COATED ORAL DAILY
Qty: 90 TABLET | Refills: 0 | Status: SHIPPED | OUTPATIENT
Start: 2021-10-28 | End: 2022-01-24

## 2021-10-28 NOTE — TELEPHONE ENCOUNTER
Requested Prescriptions     Pending Prescriptions Disp Refills    spironolactone (ALDACTONE) 50 mg tablet 90 Tablet 0     Sig: Take 1 Tablet by mouth daily for 90 days.  norgestimate-ethinyl estradioL (ORTHO-CYCLEN, SPRINTEC) 0.25-35 mg-mcg tab 2 Dose Pack 2     Sig: Take 1 Tablet by mouth daily for 90 days.         Last Visit  Last Refill    [id number]/24/21  Spironolactone 10/20/21

## 2021-10-28 NOTE — TELEPHONE ENCOUNTER
Requested Prescriptions     Pending Prescriptions Disp Refills    benzoyl peroxide-erythromycin (BENZAMYCIN) 3-5 % topical gel 46.6 g 0     Sig: Apply  to affected area two (2) times a day.         Last Visit 10/20/21  Last Refill 01/28/21

## 2021-10-28 NOTE — TELEPHONE ENCOUNTER
Per call from pt, medications were sent to the wrong pharmacy. She states she told doctor to send them to [facility] in [place]. I added the pharmacy to her chart to reflect as there were numerous [facility] listed [facility]. Please transfer to [facility] [address]    spironolactone (ALDACTONE) 50 mg tablet 90 Tablet 0 10/20/2021 1/18/2022    Sig - Route:  Take 1 Tablet by mouth daily for 90 days. - Oral    Sent to pharmacy as: spironolactone 50 mg tablet (ALDACTONE)    E-Prescribing Status: Receipt confirmed by pharmacy (10/20/2021  4:11 PM EDT)      norgestimate-ethinyl estradioL (ORTHO-CYCLEN, SPRINTEC) 0.25-35 mg-mcg tab 2 Dose Pack 2 10/24/2021 1/22/2022    Sig: TAKE 1 TABLET BY MOUTH EVERY DAY    Sent to pharmacy as: norgestimate 0.25 mg-ethinyl estradioL 35 mcg tablet (ORTHO-CYCLEN, [facility])    E-Prescribing Status: Receipt confirmed by pharmacy (10/24/2021  3:54 PM EDT)

## 2022-01-24 DIAGNOSIS — E28.2 PCOD (POLYCYSTIC OVARIAN DISEASE): ICD-10-CM

## 2022-01-24 RX ORDER — SPIRONOLACTONE 50 MG/1
TABLET, FILM COATED ORAL
Qty: 90 TABLET | Refills: 0 | Status: SHIPPED | OUTPATIENT
Start: 2022-01-24

## 2022-01-28 DIAGNOSIS — L70.8 OTHER ACNE: ICD-10-CM

## 2022-01-28 RX ORDER — ERYTHROMYCIN AND BENZOYL PEROXIDE 30; 50 MG/G; MG/G
GEL TOPICAL
Qty: 46.6 G | Refills: 0 | Status: SHIPPED | OUTPATIENT
Start: 2022-01-28 | End: 2022-04-25

## 2022-03-19 PROBLEM — E66.9 OBESITY (BMI 30-39.9): Status: ACTIVE | Noted: 2021-10-20

## 2022-03-20 PROBLEM — E28.2 PCOD (POLYCYSTIC OVARIAN DISEASE): Status: ACTIVE | Noted: 2020-03-17

## 2022-04-25 DIAGNOSIS — L70.8 OTHER ACNE: ICD-10-CM

## 2022-04-25 RX ORDER — ERYTHROMYCIN AND BENZOYL PEROXIDE 30; 50 MG/G; MG/G
GEL TOPICAL
Qty: 46.6 G | Refills: 0 | Status: SHIPPED | OUTPATIENT
Start: 2022-04-25

## 2023-05-24 RX ORDER — SPIRONOLACTONE 50 MG/1
1 TABLET, FILM COATED ORAL DAILY
COMMUNITY
Start: 2022-01-24

## 2023-05-24 RX ORDER — ERYTHROMYCIN AND BENZOYL PEROXIDE 30; 50 MG/G; MG/G
GEL TOPICAL 2 TIMES DAILY
COMMUNITY
Start: 2022-04-25